# Patient Record
Sex: FEMALE | Race: WHITE | Employment: FULL TIME
[De-identification: names, ages, dates, MRNs, and addresses within clinical notes are randomized per-mention and may not be internally consistent; named-entity substitution may affect disease eponyms.]

---

## 2022-09-20 ENCOUNTER — OFFICE VISIT (OUTPATIENT)
Dept: INTERNAL MEDICINE CLINIC | Facility: CLINIC | Age: 47
End: 2022-09-20
Payer: COMMERCIAL

## 2022-09-20 VITALS
HEIGHT: 66 IN | HEART RATE: 74 BPM | WEIGHT: 116.2 LBS | DIASTOLIC BLOOD PRESSURE: 70 MMHG | BODY MASS INDEX: 18.68 KG/M2 | TEMPERATURE: 98.6 F | OXYGEN SATURATION: 97 % | SYSTOLIC BLOOD PRESSURE: 86 MMHG | RESPIRATION RATE: 12 BRPM

## 2022-09-20 DIAGNOSIS — L40.9 PSORIASIS: ICD-10-CM

## 2022-09-20 DIAGNOSIS — G47.9 SLEEP DISTURBANCE: ICD-10-CM

## 2022-09-20 DIAGNOSIS — Z12.11 ENCOUNTER FOR SCREENING FOR COLORECTAL MALIGNANT NEOPLASM: ICD-10-CM

## 2022-09-20 DIAGNOSIS — Z23 ENCOUNTER FOR IMMUNIZATION: Primary | ICD-10-CM

## 2022-09-20 DIAGNOSIS — F41.9 ANXIETY: ICD-10-CM

## 2022-09-20 DIAGNOSIS — Z12.31 ENCOUNTER FOR SCREENING MAMMOGRAM FOR MALIGNANT NEOPLASM OF BREAST: ICD-10-CM

## 2022-09-20 DIAGNOSIS — H50.9 STRABISMUS: ICD-10-CM

## 2022-09-20 DIAGNOSIS — Z12.12 ENCOUNTER FOR SCREENING FOR COLORECTAL MALIGNANT NEOPLASM: ICD-10-CM

## 2022-09-20 PROCEDURE — 90471 IMMUNIZATION ADMIN: CPT | Performed by: INTERNAL MEDICINE

## 2022-09-20 PROCEDURE — 99204 OFFICE O/P NEW MOD 45 MIN: CPT | Performed by: INTERNAL MEDICINE

## 2022-09-20 PROCEDURE — 90715 TDAP VACCINE 7 YRS/> IM: CPT | Performed by: INTERNAL MEDICINE

## 2022-09-20 RX ORDER — ESZOPICLONE 2 MG/1
2 TABLET, FILM COATED ORAL NIGHTLY
Qty: 30 TABLET | Refills: 0 | Status: SHIPPED | OUTPATIENT
Start: 2022-11-21 | End: 2022-10-27

## 2022-09-20 RX ORDER — ALPRAZOLAM 1 MG/1
1 TABLET ORAL NIGHTLY PRN
COMMUNITY
End: 2022-09-20 | Stop reason: SDUPTHER

## 2022-09-20 RX ORDER — ALPRAZOLAM 1 MG/1
1 TABLET ORAL NIGHTLY PRN
Qty: 30 TABLET | Refills: 0 | Status: SHIPPED | OUTPATIENT
Start: 2022-09-20 | End: 2022-10-20

## 2022-09-20 RX ORDER — FLUOCINONIDE TOPICAL SOLUTION USP, 0.05% 0.5 MG/ML
SOLUTION TOPICAL DAILY
COMMUNITY
Start: 2020-10-26 | End: 2022-10-27

## 2022-09-20 RX ORDER — ESZOPICLONE 2 MG/1
2 TABLET, FILM COATED ORAL NIGHTLY PRN
Qty: 30 TABLET | Refills: 0 | Status: SHIPPED | OUTPATIENT
Start: 2022-09-20 | End: 2022-10-20

## 2022-09-20 RX ORDER — ESZOPICLONE 2 MG/1
2 TABLET, FILM COATED ORAL NIGHTLY
Qty: 30 TABLET | Refills: 0 | Status: SHIPPED | OUTPATIENT
Start: 2022-10-21 | End: 2022-11-20

## 2022-09-20 RX ORDER — VALACYCLOVIR HYDROCHLORIDE 1 G/1
TABLET, FILM COATED ORAL AS NEEDED
COMMUNITY
Start: 2022-08-30

## 2022-09-20 ASSESSMENT — PATIENT HEALTH QUESTIONNAIRE - PHQ9
SUM OF ALL RESPONSES TO PHQ QUESTIONS 1-9: 0
2. FEELING DOWN, DEPRESSED OR HOPELESS: 0
SUM OF ALL RESPONSES TO PHQ9 QUESTIONS 1 & 2: 0
SUM OF ALL RESPONSES TO PHQ QUESTIONS 1-9: 0
1. LITTLE INTEREST OR PLEASURE IN DOING THINGS: 0
SUM OF ALL RESPONSES TO PHQ QUESTIONS 1-9: 0
SUM OF ALL RESPONSES TO PHQ QUESTIONS 1-9: 0

## 2022-09-20 ASSESSMENT — ENCOUNTER SYMPTOMS: RESPIRATORY NEGATIVE: 1
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
## 2022-09-29 ENCOUNTER — TELEPHONE (OUTPATIENT)
Dept: FAMILY MEDICINE CLINIC | Facility: CLINIC | Age: 47
End: 2022-09-29

## 2022-09-29 NOTE — TELEPHONE ENCOUNTER
Received fax on behalf of patient from [facility] notifying our office that they have attempted to contact the patient several times regarding the referral that was placed to their office from Dr. Lisa Lopez with no response from the patient. This referral will be closed, and the patient will need to contact their office, if they would like to proceed with the referral process.

## 2022-10-12 ENCOUNTER — OFFICE VISIT (OUTPATIENT)
Dept: GASTROENTEROLOGY | Age: 47
End: 2022-10-12
Payer: COMMERCIAL

## 2022-10-12 VITALS
HEIGHT: 66 IN | TEMPERATURE: 98.1 F | HEART RATE: 84 BPM | BODY MASS INDEX: 18.48 KG/M2 | DIASTOLIC BLOOD PRESSURE: 87 MMHG | SYSTOLIC BLOOD PRESSURE: 90 MMHG | WEIGHT: 115 LBS | OXYGEN SATURATION: 97 %

## 2022-10-12 DIAGNOSIS — Z12.11 ENCOUNTER FOR SCREENING COLONOSCOPY: Primary | ICD-10-CM

## 2022-10-12 PROCEDURE — 99202 OFFICE O/P NEW SF 15 MIN: CPT | Performed by: INTERNAL MEDICINE

## 2022-10-12 RX ORDER — POLYETHYLENE GLYCOL 3350, SODIUM SULFATE ANHYDROUS, SODIUM BICARBONATE, SODIUM CHLORIDE, POTASSIUM CHLORIDE 236; 22.74; 6.74; 5.86; 2.97 G/4L; G/4L; G/4L; G/4L; G/4L
4 POWDER, FOR SOLUTION ORAL ONCE
Qty: 4000 ML | Refills: 0 | Status: SHIPPED | OUTPATIENT
Start: 2022-10-12 | End: 2022-10-12

## 2022-10-12 ASSESSMENT — ENCOUNTER SYMPTOMS
ABDOMINAL PAIN: 0
BLOOD IN STOOL: 0
COLOR CHANGE: 0
VOMITING: 0
TROUBLE SWALLOWING: 0
SHORTNESS OF BREATH: 0

## 2022-10-12 NOTE — PROGRESS NOTES
Fantasma Buenrostro (:  1975) is a 49 y.o. female, new patient here for evaluation of the following chief complaint(s):  Colonoscopy, Constipation (Always had an issue), Bloated, and Abdominal Pain (Cramping intermit)         ASSESSMENT/PLAN:  1. Encounter for screening colonoscopy  The following orders have not been finalized:  -     PEG 3350-KCl-NaBcb-NaCl-NaSulf (PEG-3350/ELECTROLYTES) 236 g SOLR           Subjective   SUBJECTIVE/OBJECTIVE  Presenting to arrange a screening colonoscopic evaluation denied any change in bowel habits blood in stool. No family history of colon cancer. Her sister had colon polyps. Had a history of lactose intolerance she has been checked for gluten allergy serologically in the past which was negative. He has a history of constipation and occasional left lower quadrant discomfort since her laparoscopic surgical treatment for infertility. Past Medical History:   Diagnosis Date    Strabismus 2022       No past surgical history on file. Allergies   Allergen Reactions    Terfenadine Hives    Scopolamine Nausea And Vomiting     Other reaction(s): GI Upset            Review of Systems   Constitutional:  Negative for appetite change. HENT:  Negative for mouth sores and trouble swallowing. Respiratory:  Negative for shortness of breath. Cardiovascular:  Negative for chest pain. Gastrointestinal:  Negative for abdominal pain, blood in stool and vomiting. Skin:  Negative for color change. Allergic/Immunologic: Negative for food allergies. Neurological:  Negative for seizures and weakness. Hematological:  Does not bruise/bleed easily. Objective   Physical Exam  HENT:      Head: Normocephalic. Mouth/Throat:      Mouth: Mucous membranes are moist.   Eyes:      General: No scleral icterus. Cardiovascular:      Rate and Rhythm: Normal rate and regular rhythm. Pulmonary:      Effort: No respiratory distress.    Abdominal:      General: There is no distension. Tenderness: There is no abdominal tenderness. There is no rebound. Lymphadenopathy:      Cervical: No cervical adenopathy. Skin:     Coloration: Skin is not jaundiced. Findings: No bruising. Neurological:      General: No focal deficit present. Mental Status: She is alert. Current Outpatient Medications   Medication Sig Dispense Refill    fluocinonide (LIDEX) 0.05 % external solution Apply topically daily      valACYclovir (VALTREX) 1 g tablet TAKE 2 TABLETS BY MOUTH TWICE A DAY      eszopiclone (LUNESTA) 2 MG TABS Take 1 tablet by mouth nightly as needed (for sleep) for up to 30 days. 30 tablet 0    [START ON 10/21/2022] eszopiclone (LUNESTA) 2 MG TABS Take 1 tablet by mouth nightly for 30 days. 30 tablet 0    [START ON 11/21/2022] eszopiclone (LUNESTA) 2 MG TABS Take 1 tablet by mouth nightly for 30 days. 30 tablet 0    ALPRAZolam (XANAX) 1 MG tablet Take 1 tablet by mouth nightly as needed for Anxiety for up to 30 days. 30 tablet 0     No current facility-administered medications for this visit. No family history on file. Return for colonoscopy. An electronic signature was used to authenticate this note.     --Jeannie Pat MD

## 2022-10-12 NOTE — H&P (VIEW-ONLY)
Esther Barth (:  1975) is a 49 y.o. female, new patient here for evaluation of the following chief complaint(s):  Colonoscopy, Constipation (Always had an issue), Bloated, and Abdominal Pain (Cramping intermit)         ASSESSMENT/PLAN:  1. Encounter for screening colonoscopy  The following orders have not been finalized:  -     PEG 3350-KCl-NaBcb-NaCl-NaSulf (PEG-3350/ELECTROLYTES) 236 g SOLR           Subjective   SUBJECTIVE/OBJECTIVE  Presenting to arrange a screening colonoscopic evaluation denied any change in bowel habits blood in stool. No family history of colon cancer. Her sister had colon polyps. Had a history of lactose intolerance she has been checked for gluten allergy serologically in the past which was negative. He has a history of constipation and occasional left lower quadrant discomfort since her laparoscopic surgical treatment for infertility. Past Medical History:   Diagnosis Date    Strabismus 2022       No past surgical history on file. Allergies   Allergen Reactions    Terfenadine Hives    Scopolamine Nausea And Vomiting     Other reaction(s): GI Upset            Review of Systems   Constitutional:  Negative for appetite change. HENT:  Negative for mouth sores and trouble swallowing. Respiratory:  Negative for shortness of breath. Cardiovascular:  Negative for chest pain. Gastrointestinal:  Negative for abdominal pain, blood in stool and vomiting. Skin:  Negative for color change. Allergic/Immunologic: Negative for food allergies. Neurological:  Negative for seizures and weakness. Hematological:  Does not bruise/bleed easily. Objective   Physical Exam  HENT:      Head: Normocephalic. Mouth/Throat:      Mouth: Mucous membranes are moist.   Eyes:      General: No scleral icterus. Cardiovascular:      Rate and Rhythm: Normal rate and regular rhythm. Pulmonary:      Effort: No respiratory distress.    Abdominal:      General: There is no distension. Tenderness: There is no abdominal tenderness. There is no rebound. Lymphadenopathy:      Cervical: No cervical adenopathy. Skin:     Coloration: Skin is not jaundiced. Findings: No bruising. Neurological:      General: No focal deficit present. Mental Status: She is alert. Current Outpatient Medications   Medication Sig Dispense Refill    fluocinonide (LIDEX) 0.05 % external solution Apply topically daily      valACYclovir (VALTREX) 1 g tablet TAKE 2 TABLETS BY MOUTH TWICE A DAY      eszopiclone (LUNESTA) 2 MG TABS Take 1 tablet by mouth nightly as needed (for sleep) for up to 30 days. 30 tablet 0    [START ON 10/21/2022] eszopiclone (LUNESTA) 2 MG TABS Take 1 tablet by mouth nightly for 30 days. 30 tablet 0    [START ON 11/21/2022] eszopiclone (LUNESTA) 2 MG TABS Take 1 tablet by mouth nightly for 30 days. 30 tablet 0    ALPRAZolam (XANAX) 1 MG tablet Take 1 tablet by mouth nightly as needed for Anxiety for up to 30 days. 30 tablet 0     No current facility-administered medications for this visit. No family history on file. Return for colonoscopy. An electronic signature was used to authenticate this note.     --Domitila Lopez MD

## 2022-10-21 ENCOUNTER — PREP FOR PROCEDURE (OUTPATIENT)
Dept: GASTROENTEROLOGY | Age: 47
End: 2022-10-21

## 2022-10-21 ENCOUNTER — PREP FOR PROCEDURE (OUTPATIENT)
Dept: VASCULAR SURGERY | Age: 47
End: 2022-10-21

## 2022-10-21 PROBLEM — Z12.11 ENCOUNTER FOR SCREENING COLONOSCOPY: Status: ACTIVE | Noted: 2022-10-21

## 2022-10-21 RX ORDER — SODIUM CHLORIDE 0.9 % (FLUSH) 0.9 %
5-40 SYRINGE (ML) INJECTION EVERY 12 HOURS SCHEDULED
Status: CANCELLED | OUTPATIENT
Start: 2022-10-21

## 2022-10-21 RX ORDER — SODIUM CHLORIDE 9 MG/ML
25 INJECTION, SOLUTION INTRAVENOUS PRN
Status: CANCELLED | OUTPATIENT
Start: 2022-10-21

## 2022-10-21 RX ORDER — SODIUM CHLORIDE 0.9 % (FLUSH) 0.9 %
5-40 SYRINGE (ML) INJECTION PRN
Status: CANCELLED | OUTPATIENT
Start: 2022-10-21

## 2022-10-26 RX ORDER — POLYETHYLENE GLYCOL-3350 AND ELECTROLYTES 236; 6.74; 5.86; 2.97; 22.74 G/274.31G; G/274.31G; G/274.31G; G/274.31G; G/274.31G
POWDER, FOR SOLUTION ORAL
COMMUNITY
Start: 2022-10-12 | End: 2022-10-27

## 2022-10-26 RX ORDER — CLONAZEPAM 0.5 MG/1
0.5 TABLET ORAL
COMMUNITY
Start: 2011-11-28 | End: 2022-10-27

## 2022-10-27 NOTE — PERIOP NOTE
Patient verified name, , and procedure. Type: 1a; abbreviated assessment per anesthesia guidelines    Labs per anesthesia: none    Instructed pt that they will be notified the day before their procedure by the GI Lab for time of arrival if their procedure is  [address] and Pre-op for [address]. Arrival times should be called by 5 pm. If no phone is received the patient should contact their respective hospital. The GI lab telephone number is [phone] and ES Pre-op is [phone]. Follow diet and prep instructions per office including NPO status. If patient has NOT received instructions from office patient is advised to call surgeon office, verbalizes understanding. Bath or shower the night before and the am of surgery with non-mositurizing soap. No lotions, oils, powders, cologne on skin. No make up, eye make up or jewelry. Wear loose fitting comfortable, clean clothing. Must have adult present in building the entire time . Medications for the day of procedure none, patient to hold Multivitamin and all other vitamins,supplements, and NSIADs. The following discharge instructions reviewed with patient: medication given during procedure may cause drowsiness for several hours, therefore, do not drive or operate machinery for remainder of the day. You may not drink alcohol on the day of your procedure, please resume regular diet and activity unless otherwise directed. You may experience abdominal distention for several hours that is relieved by the passage of gas. Contact your physician if you have any of the following: fever or chills, severe abdominal pain or excessive amount of bleeding or a large amount when having a bowel movement.  Occasional specks of blood with bowel movement would not be unusual.

## 2022-10-28 NOTE — PROGRESS NOTES
RN called Pt to confirm appointment time off [phone], arrival time 923, location,  requirement, and instructions for registration at the hospital.  Pt verbalized understanding.

## 2022-10-30 ENCOUNTER — ANESTHESIA EVENT (OUTPATIENT)
Dept: ENDOSCOPY | Age: 47
End: 2022-10-30
Payer: COMMERCIAL

## 2022-10-31 ENCOUNTER — ANESTHESIA (OUTPATIENT)
Dept: ENDOSCOPY | Age: 47
End: 2022-10-31
Payer: COMMERCIAL

## 2022-10-31 ENCOUNTER — HOSPITAL ENCOUNTER (OUTPATIENT)
Age: 47
Setting detail: OUTPATIENT SURGERY
Discharge: HOME | End: 2022-10-31
Attending: INTERNAL MEDICINE | Admitting: INTERNAL MEDICINE
Payer: COMMERCIAL

## 2022-10-31 VITALS
BODY MASS INDEX: 18.48 KG/M2 | HEART RATE: 65 BPM | OXYGEN SATURATION: 97 % | TEMPERATURE: 97.3 F | HEIGHT: 66 IN | DIASTOLIC BLOOD PRESSURE: 54 MMHG | RESPIRATION RATE: 16 BRPM | WEIGHT: 115 LBS | SYSTOLIC BLOOD PRESSURE: 97 MMHG

## 2022-10-31 DIAGNOSIS — Z12.11 ENCOUNTER FOR SCREENING COLONOSCOPY: ICD-10-CM

## 2022-10-31 PROCEDURE — 3700000001 HC ADD 15 MINUTES (ANESTHESIA): Performed by: INTERNAL MEDICINE

## 2022-10-31 PROCEDURE — 88305 TISSUE EXAM BY PATHOLOGIST: CPT

## 2022-10-31 PROCEDURE — 2709999900 HC NON-CHARGEABLE SUPPLY: Performed by: INTERNAL MEDICINE

## 2022-10-31 PROCEDURE — 6360000002 HC RX W HCPCS

## 2022-10-31 PROCEDURE — 2580000003 HC RX 258

## 2022-10-31 PROCEDURE — 7100000010 HC PHASE II RECOVERY - FIRST 15 MIN: Performed by: INTERNAL MEDICINE

## 2022-10-31 PROCEDURE — 2580000003 HC RX 258: Performed by: ANESTHESIOLOGY

## 2022-10-31 PROCEDURE — 2500000003 HC RX 250 WO HCPCS

## 2022-10-31 PROCEDURE — 3700000000 HC ANESTHESIA ATTENDED CARE: Performed by: INTERNAL MEDICINE

## 2022-10-31 PROCEDURE — 3609027000 HC COLONOSCOPY: Performed by: INTERNAL MEDICINE

## 2022-10-31 PROCEDURE — 7100000011 HC PHASE II RECOVERY - ADDTL 15 MIN: Performed by: INTERNAL MEDICINE

## 2022-10-31 RX ORDER — SODIUM CHLORIDE 0.9 % (FLUSH) 0.9 %
5-40 SYRINGE (ML) INJECTION PRN
Status: DISCONTINUED | OUTPATIENT
Start: 2022-10-31 | End: 2022-10-31 | Stop reason: HOSPADM

## 2022-10-31 RX ORDER — PROPOFOL 10 MG/ML
INJECTION, EMULSION INTRAVENOUS CONTINUOUS PRN
Status: DISCONTINUED | OUTPATIENT
Start: 2022-10-31 | End: 2022-10-31 | Stop reason: SDUPTHER

## 2022-10-31 RX ORDER — PROPOFOL 10 MG/ML
INJECTION, EMULSION INTRAVENOUS PRN
Status: DISCONTINUED | OUTPATIENT
Start: 2022-10-31 | End: 2022-10-31 | Stop reason: SDUPTHER

## 2022-10-31 RX ORDER — SODIUM CHLORIDE 0.9 % (FLUSH) 0.9 %
5-40 SYRINGE (ML) INJECTION EVERY 12 HOURS SCHEDULED
Status: DISCONTINUED | OUTPATIENT
Start: 2022-10-31 | End: 2022-10-31 | Stop reason: HOSPADM

## 2022-10-31 RX ORDER — LIDOCAINE HYDROCHLORIDE 20 MG/ML
INJECTION, SOLUTION EPIDURAL; INFILTRATION; INTRACAUDAL; PERINEURAL PRN
Status: DISCONTINUED | OUTPATIENT
Start: 2022-10-31 | End: 2022-10-31 | Stop reason: SDUPTHER

## 2022-10-31 RX ORDER — SODIUM CHLORIDE, SODIUM LACTATE, POTASSIUM CHLORIDE, CALCIUM CHLORIDE 600; 310; 30; 20 MG/100ML; MG/100ML; MG/100ML; MG/100ML
INJECTION, SOLUTION INTRAVENOUS CONTINUOUS
Status: DISCONTINUED | OUTPATIENT
Start: 2022-10-31 | End: 2022-10-31 | Stop reason: HOSPADM

## 2022-10-31 RX ORDER — SODIUM CHLORIDE 9 MG/ML
25 INJECTION, SOLUTION INTRAVENOUS PRN
Status: DISCONTINUED | OUTPATIENT
Start: 2022-10-31 | End: 2022-10-31 | Stop reason: HOSPADM

## 2022-10-31 RX ADMIN — SODIUM CHLORIDE, POTASSIUM CHLORIDE, SODIUM LACTATE AND CALCIUM CHLORIDE: 600; 310; 30; 20 INJECTION, SOLUTION INTRAVENOUS at 08:56

## 2022-10-31 RX ADMIN — PROPOFOL 120 MCG/KG/MIN: 10 INJECTION, EMULSION INTRAVENOUS at 09:31

## 2022-10-31 RX ADMIN — LIDOCAINE HYDROCHLORIDE 40 MG: 20 INJECTION, SOLUTION EPIDURAL; INFILTRATION; INTRACAUDAL; PERINEURAL at 09:30

## 2022-10-31 RX ADMIN — PROPOFOL 40 MG: 10 INJECTION, EMULSION INTRAVENOUS at 09:32

## 2022-10-31 RX ADMIN — PHENYLEPHRINE HYDROCHLORIDE 100 MCG: 10 INJECTION INTRAVENOUS at 09:56

## 2022-10-31 RX ADMIN — PHENYLEPHRINE HYDROCHLORIDE 100 MCG: 10 INJECTION INTRAVENOUS at 09:52

## 2022-10-31 RX ADMIN — PROPOFOL 40 MG: 10 INJECTION, EMULSION INTRAVENOUS at 09:30

## 2022-10-31 ASSESSMENT — PAIN - FUNCTIONAL ASSESSMENT: PAIN_FUNCTIONAL_ASSESSMENT: 0-10

## 2022-10-31 NOTE — ANESTHESIA PRE PROCEDURE
Department of Anesthesiology  Preprocedure Note       Name:  Manuel Cárdenas   Age:  49 y.o.  :  1975                                          MRN:  [id number]         Date:  10/31/2022      Surgeon: Micky Santacruz):  Carlos Andrade MD    Procedure: Procedure(s):  COLORECTAL CANCER SCREENING, NOT HIGH RISK    Medications prior to admission:   Prior to Admission medications    Medication Sig Start Date End Date Taking? Authorizing Provider   Multiple Vitamin (MULTIVITAMIN ADULT PO) Take 1 tablet by mouth daily   Yes Historical Provider, MD   valACYclovir (VALTREX) 1 g tablet as needed 22   Historical Provider, MD   eszopiclone (LUNESTA) 2 MG TABS Take 1 tablet by mouth nightly for 30 days. 10/21/22 11/20/22  Sri Frost DO       Current medications:    Current Facility-Administered Medications   Medication Dose Route Frequency Provider Last Rate Last Admin    sodium chloride flush 0.9 % injection 5-40 mL  5-40 mL IntraVENous 2 times per day Carlos Andrade MD        sodium chloride flush 0.9 % injection 5-40 mL  5-40 mL IntraVENous PRN Carlos Andrade MD        0.9 % sodium chloride infusion  25 mL IntraVENous PRN Carlos Andrade MD        lactated ringers infusion   IntraVENous Continuous Jered Pandey  mL/hr at 10/31/22 0856 New Bag at 10/31/22 0856       Allergies:     Allergies   Allergen Reactions    Terfenadine Hives    Scopolamine Nausea And Vomiting     Abdominal cramping         Problem List:    Patient Active Problem List   Diagnosis Code    Psoriasis L40.9    Sleep disturbance G47.9    Anxiety F41.9    Strabismus H50.9    Encounter for screening colonoscopy Z12.11       Past Medical History:        Diagnosis Date    History of kidney stones     X1 with surgical intervention    Psoriasis     Strabismus 2022       Past Surgical History:        Procedure Laterality Date    CYSTOSCOPY W/ URETERAL STENT PLACEMENT      OTHER SURGICAL HISTORY      jaw surgery    OTHER SURGICAL HISTORY nasal surgery       Social History:    Social History     Tobacco Use    Smoking status: Never    Smokeless tobacco: Never   Substance Use Topics    Alcohol use: Yes     Alcohol/week: 5.0 standard drinks     Types: 5 Glasses of wine per week                                Counseling given: Not Answered      Vital Signs (Current):   Vitals:    10/27/22 1026 10/31/22 0858   BP:  93/66   Pulse:  72   Resp:  24   Temp:  98.2 °F (36.8 °C)   TempSrc:  Oral   SpO2:  100%   Weight: 115 lb (52.2 kg)    Height: 5' 6\" (1.676 m)                                               BP Readings from Last 3 Encounters:   10/31/22 93/66   10/12/22 90/87   09/20/22 86/70       NPO Status: Time of last liquid consumption: 2300                        Time of last solid consumption: 1900                        Date of last liquid consumption: 10/30/22                        Date of last solid food consumption: 10/29/22    BMI:   Wt Readings from Last 3 Encounters:   10/27/22 115 lb (52.2 kg)   10/12/22 115 lb (52.2 kg)   09/20/22 116 lb 3.2 oz (52.7 kg)     Body mass index is 18.56 kg/m². CBC: No results found for: WBC, RBC, HGB, HCT, MCV, RDW, PLT    CMP: No results found for: NA, K, CL, CO2, BUN, CREATININE, GFRAA, AGRATIO, LABGLOM, GLUCOSE, GLU, PROT, CALCIUM, BILITOT, ALKPHOS, AST, ALT    POC Tests: No results for input(s): POCGLU, POCNA, POCK, POCCL, POCBUN, POCHEMO, POCHCT in the last 72 hours.     Coags: No results found for: PROTIME, INR, APTT    HCG (If Applicable): No results found for: PREGTESTUR, PREGSERUM, HCG, HCGQUANT     ABGs: No results found for: PHART, PO2ART, OLH0DYF, NUM6PRD, BEART, H6ZGTFAK     Type & Screen (If Applicable):  No results found for: LABABO, LABRH    Drug/Infectious Status (If Applicable):  No results found for: HIV, HEPCAB    COVID-19 Screening (If Applicable): No results found for: COVID19        Anesthesia Evaluation  Patient summary reviewed and Nursing notes reviewed  Airway: Mallampati: II  TM distance: >3 FB   Neck ROM: full  Mouth opening: > = 3 FB   Dental:          Pulmonary:Negative Pulmonary ROS breath sounds clear to auscultation                             Cardiovascular:  Exercise tolerance: good (>4 METS),           Rhythm: regular  Rate: normal                    Neuro/Psych:   (+) psychiatric history:depression/anxiety             GI/Hepatic/Renal: Neg GI/Hepatic/Renal ROS            Endo/Other: Negative Endo/Other ROS                    Abdominal:             Vascular: negative vascular ROS. Other Findings:           Anesthesia Plan      TIVA     ASA 2       Induction: intravenous. Anesthetic plan and risks discussed with patient.                         Zachary Ghotra MD   10/31/2022

## 2022-10-31 NOTE — DISCHARGE INSTRUCTIONS
Gastrointestinal Colonoscopy/Flexible Sigmoidoscopy - Lower Exam Discharge Instructions  Call Dr. Uriel Lassiter for any problems or questions. Contact the doctors office for follow up appointment as directed  Medication may cause drowsiness for several hours, therefore, do not drive or operate machinery for remainder of the day. No alcohol today. Do not make any important decisions such signing legal paperwork. Ordinarily, you may resume regular diet and activity after exam unless otherwise specified by your physician. Because of air put into your colon during exam, you may experience some abdominal distension, relieved by the passage of gas, for several hours. Contact your physician if you have any of the following:  Excessive amount of bleeding - large amount when having a bowel movement.   Occasional specks of blood with bowel movement would not be unusual.  Severe abdominal pain  Fever or Chills      Any additional instructions:   Repeat colon in 5 years pending pathology

## 2022-10-31 NOTE — INTERVAL H&P NOTE
Update History & Physical    The patient's History and Physical of October 12,  was reviewed with the patient and I examined the patient. There was no change. The surgical site was confirmed by the patient and me. Plan: The risks, benefits, expected outcome, and alternative to the recommended procedure have been discussed with the patient. Patient understands and wants to proceed with the procedure.      Electronically signed by Barrett Horton MD on 10/31/2022 at 8:58 AM

## 2022-10-31 NOTE — PROCEDURES
Operative Report    Patient: Manuel Cárdenas MRN: [id number]      YOB: 1975  Age: 49 y.o. Sex: female            Indications:  screening for colon cancer [unfilled]     Preoperative Evaluation: The patient was evaluated prior to the procedure in the GI lab admission area, the patient ASA was recorded . Consent was obtained from the patient with the risk of perforation bleeding and aspiration. Anesthesia: CHAPIN-per anesthesia    Complications: None; patient tolerated the procedure well. EBL -insignificant      Procedure: The patient was sedated in the left lateral decubitus position. Scope was advanced from the rectum to the cecum. Evaluation was performed to the cecum twice. The scope was withdrawn to the rectum, retroflexed view was performed. The rectal exam was normal.  Preparation was good [place] score of 3/3/3/:9 . Findings:   Exam to the cecum. Very tortuous colon in its course with multiple sharp angulation. Cecum examined twice. Normal cecal mucosa. A flat serrated 1 cm ascending colon polyp cold snared into 2 pieces. Normal transverse descending sigmoid and rectal mucosa      Postoperative Diagnosis: Ascending colon polyp    08050 Colonoscopy, Flexible; with removal of tumor(s), polyp(s), or other lesion(s) by snare technique      Recommendations:   - If adenoma is present, repeat colonoscopy in 5 years.       Signed By:  Carlos Andrade MD     October 31, 2022

## 2022-10-31 NOTE — ANESTHESIA POSTPROCEDURE EVALUATION
Department of Anesthesiology  Postprocedure Note    Patient: Loulou Pollard  MRN: [id number]  YOB: 1975  Date of evaluation: 10/31/2022      Procedure Summary     Date: 10/31/22 Room / Location: [facility] ENDO 03 / [facility] ENDOSCOPY    Anesthesia Start: 2567 Anesthesia Stop: 1004    Procedure: COLORECTAL CANCER SCREENING, NOT HIGH RISK/POLYPECTOMY - COLD SNARE (Lower GI Region) Diagnosis:       Encounter for screening colonoscopy      (Encounter for screening colonoscopy [Z12.11])    Surgeons: Alphonse Strickland MD Responsible Provider: Wesley Mcguire MD    Anesthesia Type: TIVA ASA Status: 2          Anesthesia Type: No value filed.     Jaren Phase I: Jaren Score: 10    Jaren Phase II: Jaren Score: 10      Anesthesia Post Evaluation    Patient location during evaluation: PACU  Patient participation: complete - patient participated  Level of consciousness: awake and alert  Airway patency: patent  Nausea & Vomiting: no nausea and no vomiting  Complications: no  Cardiovascular status: hemodynamically stable  Respiratory status: acceptable  Hydration status: euvolemic  Multimodal analgesia pain management approach

## 2022-11-11 ENCOUNTER — TELEMEDICINE (OUTPATIENT)
Dept: INTERNAL MEDICINE CLINIC | Facility: CLINIC | Age: 47
End: 2022-11-11
Payer: COMMERCIAL

## 2022-11-11 DIAGNOSIS — F41.9 ANXIETY: Primary | ICD-10-CM

## 2022-11-11 DIAGNOSIS — G47.9 SLEEP DISTURBANCE: ICD-10-CM

## 2022-11-11 PROCEDURE — 99213 OFFICE O/P EST LOW 20 MIN: CPT | Performed by: INTERNAL MEDICINE

## 2022-11-11 RX ORDER — ALPRAZOLAM 1 MG/1
1 TABLET ORAL NIGHTLY PRN
Qty: 30 TABLET | Refills: 2 | Status: SHIPPED | OUTPATIENT
Start: 2022-11-11 | End: 2023-02-09

## 2022-11-11 RX ORDER — VALACYCLOVIR HYDROCHLORIDE 1 G/1
1 TABLET, FILM COATED ORAL AS NEEDED
Qty: 30 TABLET | Refills: 1 | Status: SHIPPED | OUTPATIENT
Start: 2022-11-11

## 2022-11-11 RX ORDER — FLUOCINONIDE TOPICAL SOLUTION USP, 0.05% 0.5 MG/ML
SOLUTION TOPICAL
COMMUNITY
Start: 2022-10-27

## 2022-11-11 RX ORDER — ESZOPICLONE 2 MG/1
2 TABLET, FILM COATED ORAL NIGHTLY
Qty: 30 TABLET | Refills: 2 | Status: SHIPPED | OUTPATIENT
Start: 2022-11-11 | End: 2023-02-09

## 2022-11-11 RX ORDER — ALPRAZOLAM 1 MG/1
1 TABLET ORAL NIGHTLY PRN
COMMUNITY
End: 2022-11-11 | Stop reason: SDUPTHER

## 2022-11-11 ASSESSMENT — PATIENT HEALTH QUESTIONNAIRE - PHQ9
1. LITTLE INTEREST OR PLEASURE IN DOING THINGS: 0
2. FEELING DOWN, DEPRESSED OR HOPELESS: 0
SUM OF ALL RESPONSES TO PHQ QUESTIONS 1-9: 0
SUM OF ALL RESPONSES TO PHQ9 QUESTIONS 1 & 2: 0
SUM OF ALL RESPONSES TO PHQ QUESTIONS 1-9: 0

## 2022-11-11 ASSESSMENT — ENCOUNTER SYMPTOMS
RESPIRATORY NEGATIVE: 1
GASTROINTESTINAL NEGATIVE: 1

## 2022-11-11 NOTE — PROGRESS NOTES
Yadi Muñoz D.O. [address]  [address], [address] [facility] [address]  Tel: [phone]    Virtual Visit: Follow Up     Patient Name: Wilton Mccall   :  1975   MRN:   [id number]      Today's Date: 22 10:51 AM    Subjective     The patient is a 49y.o.-year-old female who presents via telehealth for follow-up. Today: Patient is doing well. She states that she has no new symptoms or complaints. She just recently completed her colonoscopy last week and a hyperplastic polyp was found in the ascending colon. She needs refills on her Xanax and her Lunesta. She is taking them as prescribed and states they are still working well for her. She states that she does have a new job opportunity back in [employer], she states that if she does take it she will be spending a lot more time back in [place] but will still be going back and forth between [place] in [facility] [place]. Review of Systems   Constitutional: Negative. HENT: Negative. Respiratory: Negative. Cardiovascular: Negative. Gastrointestinal: Negative. Genitourinary: Negative. Musculoskeletal: Negative. Skin: Negative. Neurological: Negative. Psychiatric/Behavioral: Negative. Past Medical History:   Diagnosis Date    History of kidney stones     X1 with surgical intervention    Psoriasis     Strabismus 2022     Social History     Socioeconomic History    Marital status: Single     Spouse name: Not on file    Number of children: Not on file    Years of education: Not on file    Highest education level: Not on file   Occupational History    Not on file   Tobacco Use    Smoking status: Never    Smokeless tobacco: Never   Vaping Use    Vaping Use: Never used   Substance and Sexual Activity    Alcohol use:  Yes     Alcohol/week: 5.0 standard drinks     Types: 5 Glasses of wine per week    Drug use: Never    Sexual activity: Not on file   Other Topics Concern    Not on file Social History Narrative    Not on file     Social Determinants of Health     Financial Resource Strain: Not on file   Food Insecurity: Not on file   Transportation Needs: Not on file   Physical Activity: Not on file   Stress: Not on file   Social Connections: Not on file   Intimate Partner Violence: Not on file   Housing Stability: Not on file         Current Outpatient Medications   Medication Sig    fluocinonide (LIDEX) 0.05 % external solution     ALPRAZolam (XANAX) 1 MG tablet Take 1 mg by mouth nightly as needed for Sleep or Anxiety. Multiple Vitamin (MULTIVITAMIN ADULT PO) Take 1 tablet by mouth daily    valACYclovir (VALTREX) 1 g tablet as needed    eszopiclone (LUNESTA) 2 MG TABS Take 1 tablet by mouth nightly for 30 days. No current facility-administered medications for this visit. Objective     There were no vitals filed for this visit. Physical Exam:  Constitutional: Appears well kempt. Alert/oriented x3. In no acute distress. Head: Normocephalic No trauma. Psychiatric: Normal thought content. Normal behavior. Normal judgment. Recommendations     Assessment:  Patient Active Problem List   Diagnosis    Psoriasis    Sleep disturbance    Anxiety    Strabismus    Encounter for screening colonoscopy      Plan:  -Refill Xanax  -Refill Lunesta    -Previous medical records and/or labs/tests available to me reviewed, any records outstanding not available requested  -The risks, benefits, and medical necessity of all medications, tests labs, and any other orders that were ordered at today's visit were discussed with the patient including all elective or patient requested orders. Decision to order or not order tests or based on this risk/benefit ratio and medical necessity.  -The patient expresses understanding of the plan as I've explained it to her and is in agreement with the current plan.     Follow Up: 3 months virtually    Signed: Joe Thomas D.O.  11/11/22  10:51 AM    This visit was conducted using telehealth services of either [email or web address] or [address] [place] [address]. The physical exam was limited due to the nature of the virtual visit. The patient's consent was obtained to conduct such a visit. Total time face-to-face via video was 15 minutes. All concerns were discussed and addressed at this visit. If the patient has any other questions or concerns they are to call to schedule an in person appointment or if it is an emergency, go to the ER.

## 2022-11-16 ENCOUNTER — APPOINTMENT (RX ONLY)
Dept: URBAN - METROPOLITAN AREA CLINIC 329 | Facility: CLINIC | Age: 47
Setting detail: DERMATOLOGY
End: 2022-11-16

## 2022-11-16 DIAGNOSIS — Z41.9 ENCOUNTER FOR PROCEDURE FOR PURPOSES OTHER THAN REMEDYING HEALTH STATE, UNSPECIFIED: ICD-10-CM

## 2022-11-16 PROCEDURE — ? GENTLEMAX

## 2022-11-16 ASSESSMENT — LOCATION ZONE DERM: LOCATION ZONE: LEG

## 2022-11-16 ASSESSMENT — LOCATION DETAILED DESCRIPTION DERM
LOCATION DETAILED: LEFT ANTERIOR PROXIMAL THIGH
LOCATION DETAILED: RIGHT ANTERIOR PROXIMAL THIGH

## 2022-11-16 ASSESSMENT — LOCATION SIMPLE DESCRIPTION DERM
LOCATION SIMPLE: RIGHT THIGH
LOCATION SIMPLE: LEFT THIGH

## 2022-11-16 NOTE — PROCEDURE: GENTLEMAX
Price (Use Numbers Only, No Special Characters Or $): 018 Price (Use Numbers Only, No Special Characters Or $): 975

## 2022-11-20 PROBLEM — Z12.11 ENCOUNTER FOR SCREENING COLONOSCOPY: Status: RESOLVED | Noted: 2022-10-21 | Resolved: 2022-11-20

## 2022-12-12 RX ORDER — VALACYCLOVIR HYDROCHLORIDE 1 G/1
1 TABLET, FILM COATED ORAL AS NEEDED
Qty: 30 TABLET | Refills: 1 | Status: SHIPPED | OUTPATIENT
Start: 2022-12-12

## 2022-12-12 NOTE — TELEPHONE ENCOUNTER
As of last office note (11/11/2022), patient is still taking this medication and will be out of medication by their appointment (02/17/2023). Pended medication has correct quantity and pended to preferred pharmacy. Request submited to Dr. Fred Moise (On Call Physician) in absence of Jose Abad (PCP).

## 2023-02-17 ENCOUNTER — TELEMEDICINE (OUTPATIENT)
Dept: INTERNAL MEDICINE CLINIC | Facility: CLINIC | Age: 48
End: 2023-02-17
Payer: COMMERCIAL

## 2023-02-17 DIAGNOSIS — G47.9 SLEEP DISTURBANCE: Primary | ICD-10-CM

## 2023-02-17 DIAGNOSIS — F43.9 STRESS: ICD-10-CM

## 2023-02-17 DIAGNOSIS — F41.9 ANXIETY: ICD-10-CM

## 2023-02-17 PROCEDURE — 99213 OFFICE O/P EST LOW 20 MIN: CPT | Performed by: INTERNAL MEDICINE

## 2023-02-17 RX ORDER — ESZOPICLONE 2 MG/1
2 TABLET, FILM COATED ORAL NIGHTLY
COMMUNITY
End: 2023-02-17 | Stop reason: SDUPTHER

## 2023-02-17 RX ORDER — ESZOPICLONE 2 MG/1
2 TABLET, FILM COATED ORAL NIGHTLY
Qty: 90 TABLET | Refills: 0 | Status: SHIPPED | OUTPATIENT
Start: 2023-02-17 | End: 2023-05-18

## 2023-02-17 SDOH — ECONOMIC STABILITY: HOUSING INSECURITY
IN THE LAST 12 MONTHS, WAS THERE A TIME WHEN YOU DID NOT HAVE A STEADY PLACE TO SLEEP OR SLEPT IN A SHELTER (INCLUDING NOW)?: NO

## 2023-02-17 SDOH — ECONOMIC STABILITY: INCOME INSECURITY: HOW HARD IS IT FOR YOU TO PAY FOR THE VERY BASICS LIKE FOOD, HOUSING, MEDICAL CARE, AND HEATING?: NOT HARD AT ALL

## 2023-02-17 SDOH — ECONOMIC STABILITY: FOOD INSECURITY: WITHIN THE PAST 12 MONTHS, YOU WORRIED THAT YOUR FOOD WOULD RUN OUT BEFORE YOU GOT MONEY TO BUY MORE.: NEVER TRUE

## 2023-02-17 SDOH — ECONOMIC STABILITY: FOOD INSECURITY: WITHIN THE PAST 12 MONTHS, THE FOOD YOU BOUGHT JUST DIDN'T LAST AND YOU DIDN'T HAVE MONEY TO GET MORE.: NEVER TRUE

## 2023-02-17 ASSESSMENT — ANXIETY QUESTIONNAIRES
3. WORRYING TOO MUCH ABOUT DIFFERENT THINGS: 0
4. TROUBLE RELAXING: 0
6. BECOMING EASILY ANNOYED OR IRRITABLE: 0
IF YOU CHECKED OFF ANY PROBLEMS ON THIS QUESTIONNAIRE, HOW DIFFICULT HAVE THESE PROBLEMS MADE IT FOR YOU TO DO YOUR WORK, TAKE CARE OF THINGS AT HOME, OR GET ALONG WITH OTHER PEOPLE: NOT DIFFICULT AT ALL
GAD7 TOTAL SCORE: 0
1. FEELING NERVOUS, ANXIOUS, OR ON EDGE: 0
7. FEELING AFRAID AS IF SOMETHING AWFUL MIGHT HAPPEN: 0
5. BEING SO RESTLESS THAT IT IS HARD TO SIT STILL: 0
2. NOT BEING ABLE TO STOP OR CONTROL WORRYING: 0

## 2023-02-17 ASSESSMENT — ENCOUNTER SYMPTOMS
RESPIRATORY NEGATIVE: 1
GASTROINTESTINAL NEGATIVE: 1
EYES NEGATIVE: 1

## 2023-02-17 ASSESSMENT — PATIENT HEALTH QUESTIONNAIRE - PHQ9
SUM OF ALL RESPONSES TO PHQ QUESTIONS 1-9: 0
SUM OF ALL RESPONSES TO PHQ9 QUESTIONS 1 & 2: 0
1. LITTLE INTEREST OR PLEASURE IN DOING THINGS: 0
2. FEELING DOWN, DEPRESSED OR HOPELESS: 0
SUM OF ALL RESPONSES TO PHQ QUESTIONS 1-9: 0
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
## 2023-05-17 ENCOUNTER — TELEMEDICINE (OUTPATIENT)
Dept: INTERNAL MEDICINE CLINIC | Facility: CLINIC | Age: 48
End: 2023-05-17
Payer: COMMERCIAL

## 2023-05-17 DIAGNOSIS — G47.9 SLEEP DISTURBANCE: Primary | ICD-10-CM

## 2023-05-17 DIAGNOSIS — B00.9 HERPES SIMPLEX TYPE 1 INFECTION: ICD-10-CM

## 2023-05-17 DIAGNOSIS — F41.9 ANXIETY: ICD-10-CM

## 2023-05-17 PROCEDURE — 99214 OFFICE O/P EST MOD 30 MIN: CPT | Performed by: INTERNAL MEDICINE

## 2023-05-17 RX ORDER — ALPRAZOLAM 1 MG/1
1 TABLET ORAL NIGHTLY PRN
Qty: 30 TABLET | Refills: 2 | Status: SHIPPED | OUTPATIENT
Start: 2023-05-17 | End: 2023-08-15

## 2023-05-17 RX ORDER — ESZOPICLONE 2 MG/1
2 TABLET, FILM COATED ORAL NIGHTLY
Qty: 90 TABLET | Refills: 0 | Status: SHIPPED | OUTPATIENT
Start: 2023-05-17 | End: 2023-08-15

## 2023-05-17 RX ORDER — VALACYCLOVIR HYDROCHLORIDE 1 G/1
1 TABLET, FILM COATED ORAL 2 TIMES DAILY
Qty: 20 TABLET | Refills: 0 | Status: SHIPPED | OUTPATIENT
Start: 2023-05-17 | End: 2023-05-27

## 2023-05-17 SDOH — ECONOMIC STABILITY: INCOME INSECURITY: HOW HARD IS IT FOR YOU TO PAY FOR THE VERY BASICS LIKE FOOD, HOUSING, MEDICAL CARE, AND HEATING?: NOT HARD AT ALL

## 2023-05-17 SDOH — ECONOMIC STABILITY: FOOD INSECURITY: WITHIN THE PAST 12 MONTHS, THE FOOD YOU BOUGHT JUST DIDN'T LAST AND YOU DIDN'T HAVE MONEY TO GET MORE.: NEVER TRUE

## 2023-05-17 SDOH — ECONOMIC STABILITY: FOOD INSECURITY: WITHIN THE PAST 12 MONTHS, YOU WORRIED THAT YOUR FOOD WOULD RUN OUT BEFORE YOU GOT MONEY TO BUY MORE.: NEVER TRUE

## 2023-05-17 ASSESSMENT — PATIENT HEALTH QUESTIONNAIRE - PHQ9
SUM OF ALL RESPONSES TO PHQ QUESTIONS 1-9: 0
1. LITTLE INTEREST OR PLEASURE IN DOING THINGS: 0
SUM OF ALL RESPONSES TO PHQ9 QUESTIONS 1 & 2: 0
SUM OF ALL RESPONSES TO PHQ QUESTIONS 1-9: 0
2. FEELING DOWN, DEPRESSED OR HOPELESS: 0

## 2023-05-17 ASSESSMENT — ANXIETY QUESTIONNAIRES
7. FEELING AFRAID AS IF SOMETHING AWFUL MIGHT HAPPEN: 0
IF YOU CHECKED OFF ANY PROBLEMS ON THIS QUESTIONNAIRE, HOW DIFFICULT HAVE THESE PROBLEMS MADE IT FOR YOU TO DO YOUR WORK, TAKE CARE OF THINGS AT HOME, OR GET ALONG WITH OTHER PEOPLE: NOT DIFFICULT AT ALL
5. BEING SO RESTLESS THAT IT IS HARD TO SIT STILL: 0
1. FEELING NERVOUS, ANXIOUS, OR ON EDGE: 0
3. WORRYING TOO MUCH ABOUT DIFFERENT THINGS: 0
GAD7 TOTAL SCORE: 0
4. TROUBLE RELAXING: 0
2. NOT BEING ABLE TO STOP OR CONTROL WORRYING: 0
6. BECOMING EASILY ANNOYED OR IRRITABLE: 0

## 2023-05-17 NOTE — PROGRESS NOTES
at all   Food Insecurity: No Food Insecurity    Worried About [address] in the Last Year: Never true    Ran Out of Food in the Last Year: Never true   Transportation Needs: Unknown    Lack of Transportation (Medical): Not on file    Lack of Transportation (Non-Medical): No   Physical Activity: Not on file   Stress: Not on file   Social Connections: Not on file   Intimate Partner Violence: Not on file   Housing Stability: Unknown    Unable to Pay for Housing in the Last Year: Not on file    Number of Places Lived in the Last Year: Not on file    Unstable Housing in the Last Year: No         Current Outpatient Medications   Medication Sig    eszopiclone (LUNESTA) 2 MG TABS Take 1 tablet by mouth nightly for 90 days. Max Daily Amount: 2 mg    ALPRAZolam (XANAX) 1 MG tablet Take 1 tablet by mouth nightly as needed for Sleep for up to 90 days. Max Daily Amount: 1 mg    valACYclovir (VALTREX) 1 g tablet Take 1 tablet by mouth 2 times daily for 10 days Take 1 tablet by mouth 2 times daily for 2 days for acute cold sores. fluocinonide (LIDEX) 0.05 % external solution     Multiple Vitamin (MULTIVITAMIN ADULT PO) Take 1 tablet by mouth daily (Patient not taking: Reported on 2/17/2023)     No current facility-administered medications for this visit. Objective     There were no vitals filed for this visit. Physical Exam:  Constitutional: Appears well kempt. Alert/oriented x3. In no acute distress. Head: Normocephalic No trauma. Psychiatric: Normal thought content. Normal behavior. Normal judgment.      Recommendations     Assessment:  Patient Active Problem List   Diagnosis    Psoriasis    Sleep disturbance    Anxiety    Strabismus    Stress      Plan:  Anxiety and insomnia/trouble sleeping  -Lunesta 2 mg 1 tablet nightly as needed    Anxiety/increased stress levels  -xanax 1 mg PRN  -Continue therapy    Herpes simplex 1/cold sores  -Valtrex 1000 mg twice daily for 2 days when she has flareups    History

## 2023-05-31 DIAGNOSIS — G47.9 SLEEP DISTURBANCE: ICD-10-CM

## 2023-05-31 DIAGNOSIS — F41.9 ANXIETY: ICD-10-CM

## 2023-05-31 RX ORDER — VALACYCLOVIR HYDROCHLORIDE 1 G/1
1000 TABLET, FILM COATED ORAL 2 TIMES DAILY
Qty: 20 TABLET | Refills: 0 | Status: SHIPPED | OUTPATIENT
Start: 2023-05-31 | End: 2023-06-10

## 2023-05-31 RX ORDER — ALPRAZOLAM 1 MG/1
1 TABLET ORAL NIGHTLY PRN
Qty: 30 TABLET | Refills: 0 | Status: SHIPPED | OUTPATIENT
Start: 2023-05-31 | End: 2023-06-30

## 2023-05-31 RX ORDER — ESZOPICLONE 2 MG/1
2 TABLET, FILM COATED ORAL NIGHTLY
Qty: 30 TABLET | Refills: 0 | Status: SHIPPED | OUTPATIENT
Start: 2023-05-31 | End: 2023-06-30

## 2023-05-31 RX ORDER — VALACYCLOVIR HYDROCHLORIDE 1 G/1
1000 TABLET, FILM COATED ORAL 2 TIMES DAILY
COMMUNITY
End: 2023-05-31 | Stop reason: SDUPTHER

## 2023-05-31 NOTE — TELEPHONE ENCOUNTER
Spoke with preferred local pharmacy and cancelled the recent prescriptions sent on 05/17/2023 of [address] [facility]. Patient notified that we can submit the request for medication to the out of state pharmacy, however that pharmacy can still decline to fill the prescription. Patient disconnected call.

## 2023-05-31 NOTE — TELEPHONE ENCOUNTER
----- Message from [facility] sent at 5/30/2023 10:35 AM EDT -----  Subject: Refill Request    QUESTIONS  Name of Medication? eszopiclone (LUNESTA) 2 MG TABS  Patient-reported dosage and instructions? as needed   How many days do you have left? 0  Preferred Pharmacy? [id number]  Pharmacy phone number (if available)? [phone]  ---------------------------------------------------------------------------  --------------,  Name of Medication? ALPRAZolam (XANAX) 1 MG tablet  Patient-reported dosage and instructions? as needed   How many days do you have left? 0  Preferred Pharmacy? [id number]  Pharmacy phone number (if available)? [phone]  ---------------------------------------------------------------------------  --------------,  Name of Medication? Other - valtrax  Patient-reported dosage and instructions? as needed   How many days do you have left? 0  Preferred Pharmacy? [id number]  Pharmacy phone number (if available)? [phone]  ---------------------------------------------------------------------------  --------------  CALL BACK INFO  What is the best way for the office to contact you? OK to leave message on   voicemail  Preferred Call Back Phone Number? [phone]  ---------------------------------------------------------------------------  --------------  SCRIPT ANSWERS  Relationship to Patient?  Self

## 2023-09-12 ENCOUNTER — TELEPHONE (OUTPATIENT)
Dept: INTERNAL MEDICINE CLINIC | Facility: CLINIC | Age: 48
End: 2023-09-12

## 2023-09-12 NOTE — TELEPHONE ENCOUNTER
----- Message from Eber Santacruz sent at 9/12/2023 11:29 AM EDT -----  Subject: Medication Problem    Medication: eszopiclone (LUNESTA) 2 MG TABS  Dosage: 2 MG one a day nightly   Ordering Provider: Lashawn Chaparro     Question/Problem: PT called and Jennifer Church is not covered under Insurance   unless they receive notice from Doctor stating its needed to have 30 at a   time. PT is asking if Doctor could contact Insurance. [employer] is the new   insurance.  Providers Call for Insurance [phone]      Pharmacy: [address] [place] [address], [address] [facility] [address] [phone]    ---------------------------------------------------------------------------  --------------  Harriett Dominguez INFO  [phone]; OK to leave message on voicemail  ---------------------------------------------------------------------------  --------------    SCRIPT ANSWERS  Relationship to Patient: Self

## 2023-09-14 NOTE — TELEPHONE ENCOUNTER
Spoke with patient and notified her that we will do a Prior Authorization. Patient verbally expressed understanding and denied all additional questions, comments and/or concerns.

## 2023-09-20 NOTE — TELEPHONE ENCOUNTER
Prior Authorization has been electronically submitted to patient insurance carrier via Covermymeds.     Key: [id number] - PA Case ID: [id number]

## 2023-09-25 NOTE — TELEPHONE ENCOUNTER
Attempt to notify patient that according to her insurance carrier the Prior Authorization for Lunesta 2 MG has been denied via Covermymeds, however patient was unavailable. Detailed voicemail was left at which time patient can return call if they have any questions, comments and/or concerns. Your PA request has been denied. Additional information will be provided in the denial communication.

## 2023-10-03 ENCOUNTER — TELEPHONE (OUTPATIENT)
Dept: INTERNAL MEDICINE CLINIC | Facility: CLINIC | Age: 48
End: 2023-10-03

## 2023-10-03 NOTE — TELEPHONE ENCOUNTER
Received fax from [phone] (patient insurance carrier) that the appeal for the patients LUNESTA 2 MG has been approved:    09/29/2023-09/29/2026

## 2023-10-20 ENCOUNTER — TELEPHONE (OUTPATIENT)
Dept: INTERNAL MEDICINE CLINIC | Facility: CLINIC | Age: 48
End: 2023-10-20

## 2023-10-20 NOTE — TELEPHONE ENCOUNTER
Patient is upset that nobody reached out to her to tell her that the PA needed for Lunesta had been approved, she has heard nothing from the office or the insurance company. Patient is also is need of a new Lunesta prescription, to be sent to [facility] on [address].   Patient last seen 5-17-23 - televisit              Next OV 10-24-23- My Chart visit

## 2023-10-24 ENCOUNTER — TELEMEDICINE (OUTPATIENT)
Dept: INTERNAL MEDICINE CLINIC | Facility: CLINIC | Age: 48
End: 2023-10-24
Payer: COMMERCIAL

## 2023-10-24 DIAGNOSIS — L40.9 PSORIASIS: ICD-10-CM

## 2023-10-24 DIAGNOSIS — F41.9 ANXIETY: Primary | ICD-10-CM

## 2023-10-24 DIAGNOSIS — H50.9 STRABISMUS: ICD-10-CM

## 2023-10-24 DIAGNOSIS — F43.9 STRESS: ICD-10-CM

## 2023-10-24 DIAGNOSIS — B00.9 HERPES SIMPLEX TYPE 1 INFECTION: ICD-10-CM

## 2023-10-24 DIAGNOSIS — G47.9 SLEEP DISTURBANCE: ICD-10-CM

## 2023-10-24 DIAGNOSIS — Z01.419 ENCOUNTER FOR GYNECOLOGICAL EXAMINATION: ICD-10-CM

## 2023-10-24 PROCEDURE — 99214 OFFICE O/P EST MOD 30 MIN: CPT | Performed by: INTERNAL MEDICINE

## 2023-10-24 RX ORDER — ALPRAZOLAM 1 MG/1
1 TABLET ORAL NIGHTLY PRN
Qty: 30 TABLET | Refills: 2 | Status: SHIPPED | OUTPATIENT
Start: 2023-10-24 | End: 2024-01-22

## 2023-10-24 RX ORDER — ALPRAZOLAM 1 MG/1
TABLET ORAL
COMMUNITY
Start: 2023-09-11 | End: 2023-10-24 | Stop reason: SDUPTHER

## 2023-10-24 RX ORDER — ESZOPICLONE 2 MG/1
2 TABLET, FILM COATED ORAL NIGHTLY
COMMUNITY

## 2023-10-24 SDOH — ECONOMIC STABILITY: INCOME INSECURITY: HOW HARD IS IT FOR YOU TO PAY FOR THE VERY BASICS LIKE FOOD, HOUSING, MEDICAL CARE, AND HEATING?: NOT HARD AT ALL

## 2023-10-24 SDOH — ECONOMIC STABILITY: FOOD INSECURITY: WITHIN THE PAST 12 MONTHS, YOU WORRIED THAT YOUR FOOD WOULD RUN OUT BEFORE YOU GOT MONEY TO BUY MORE.: NEVER TRUE

## 2023-10-24 SDOH — ECONOMIC STABILITY: FOOD INSECURITY: WITHIN THE PAST 12 MONTHS, THE FOOD YOU BOUGHT JUST DIDN'T LAST AND YOU DIDN'T HAVE MONEY TO GET MORE.: NEVER TRUE

## 2023-10-24 ASSESSMENT — PATIENT HEALTH QUESTIONNAIRE - PHQ9
SUM OF ALL RESPONSES TO PHQ QUESTIONS 1-9: 0
SUM OF ALL RESPONSES TO PHQ9 QUESTIONS 1 & 2: 0
1. LITTLE INTEREST OR PLEASURE IN DOING THINGS: 0
SUM OF ALL RESPONSES TO PHQ QUESTIONS 1-9: 0
2. FEELING DOWN, DEPRESSED OR HOPELESS: 0

## 2023-10-24 ASSESSMENT — ANXIETY QUESTIONNAIRES
IF YOU CHECKED OFF ANY PROBLEMS ON THIS QUESTIONNAIRE, HOW DIFFICULT HAVE THESE PROBLEMS MADE IT FOR YOU TO DO YOUR WORK, TAKE CARE OF THINGS AT HOME, OR GET ALONG WITH OTHER PEOPLE: NOT DIFFICULT AT ALL
3. WORRYING TOO MUCH ABOUT DIFFERENT THINGS: 0
4. TROUBLE RELAXING: 0
6. BECOMING EASILY ANNOYED OR IRRITABLE: 0
5. BEING SO RESTLESS THAT IT IS HARD TO SIT STILL: 0
7. FEELING AFRAID AS IF SOMETHING AWFUL MIGHT HAPPEN: 0
2. NOT BEING ABLE TO STOP OR CONTROL WORRYING: 0
GAD7 TOTAL SCORE: 0
1. FEELING NERVOUS, ANXIOUS, OR ON EDGE: 0

## 2023-10-24 NOTE — PROGRESS NOTES
[place].    Review of Systems   All other systems reviewed and are negative. Past Medical History:   Diagnosis Date    History of kidney stones     X1 with surgical intervention    Psoriasis     Strabismus 09/20/2022     Social History     Socioeconomic History    Marital status: Single     Spouse name: Not on file    Number of children: Not on file    Years of education: Not on file    Highest education level: Not on file   Occupational History    Not on file   Tobacco Use    Smoking status: Never    Smokeless tobacco: Never   Vaping Use    Vaping Use: Never used   Substance and Sexual Activity    Alcohol use: Yes     Alcohol/week: 5.0 standard drinks of alcohol     Types: 5 Glasses of wine per week    Drug use: Never    Sexual activity: Not on file   Other Topics Concern    Not on file   Social History Narrative    Not on file     Social Determinants of Health     Financial Resource Strain: Low Risk  (10/24/2023)    Overall Financial Resource Strain (CARDIA)     Difficulty of Paying Living Expenses: Not hard at all   Food Insecurity: No Food Insecurity (10/24/2023)    Hunger Vital Sign     Worried About Running Out of Food in the Last Year: Never true     [address] in the Last Year: Never true   Transportation Needs: Unknown (10/24/2023)    PRAPARE - Transportation     Lack of Transportation (Medical): Not on file     Lack of Transportation (Non-Medical): No   Physical Activity: Not on file   Stress: Not on file   Social Connections: Not on file   Intimate Partner Violence: Not on file   Housing Stability: Unknown (10/24/2023)    Housing Stability Vital Sign     Unable to Pay for Housing in the Last Year: Not on file     Number of Places Lived in the Last Year: Not on file     Unstable Housing in the Last Year: No         Current Outpatient Medications   Medication Sig    ALPRAZolam (XANAX) 1 MG tablet Take 1 tablet by mouth nightly as needed for Sleep for up to 90 days.  Max Daily Amount: 1 mg

## 2023-11-20 DIAGNOSIS — G47.9 SLEEP DISORDER, UNSPECIFIED: ICD-10-CM

## 2023-11-20 RX ORDER — ESZOPICLONE 2 MG/1
TABLET, FILM COATED ORAL
Qty: 30 TABLET | Refills: 2 | Status: SHIPPED | OUTPATIENT
Start: 2023-11-20 | End: 2024-02-18

## 2023-11-20 NOTE — TELEPHONE ENCOUNTER
As of last office note (10/24/2023), patient is still taking this medication and will be out of medication by their appointment (04/24/2024). Pended medication has correct quantity and pended to preferred pharmacy.

## 2023-12-11 ENCOUNTER — OFFICE VISIT (OUTPATIENT)
Dept: OBGYN CLINIC | Age: 48
End: 2023-12-11
Payer: COMMERCIAL

## 2023-12-11 VITALS
WEIGHT: 109 LBS | BODY MASS INDEX: 17.52 KG/M2 | DIASTOLIC BLOOD PRESSURE: 60 MMHG | SYSTOLIC BLOOD PRESSURE: 102 MMHG | HEIGHT: 66 IN

## 2023-12-11 DIAGNOSIS — Z12.31 SCREENING MAMMOGRAM FOR BREAST CANCER: ICD-10-CM

## 2023-12-11 DIAGNOSIS — Z98.890 HISTORY OF MYOMECTOMY: ICD-10-CM

## 2023-12-11 DIAGNOSIS — Z01.419 WOMEN'S ANNUAL ROUTINE GYNECOLOGICAL EXAMINATION: Primary | ICD-10-CM

## 2023-12-11 PROCEDURE — 99386 PREV VISIT NEW AGE 40-64: CPT | Performed by: OBSTETRICS & GYNECOLOGY

## 2023-12-12 ASSESSMENT — ENCOUNTER SYMPTOMS
RESPIRATORY NEGATIVE: 1
GASTROINTESTINAL NEGATIVE: 1

## 2023-12-23 RX ORDER — FLUCONAZOLE 150 MG/1
150 TABLET ORAL ONCE
Qty: 1 TABLET | Refills: 0 | Status: SHIPPED | OUTPATIENT
Start: 2023-12-23 | End: 2023-12-23

## 2024-01-29 NOTE — PROGRESS NOTES
Colpo Note  49 y.o.  presents for colposcopy. 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  She reports history of previous abnormal paps in  with [facility]. At the time she didn't get a colposcopy but got an EMB that was normal  Denies postcoital bleeding or pelvic pain. No complaints.     BP (!) 100/58   Ht 1.676 m (5' 6\")   Wt 48.5 kg (107 lb)   BMI 17.27 kg/m²     Physical Exam    Colposcopy:   Consent obtained and timeout performed. Speculum inserted into vagina, cervix visualized and coated with acetic acid solution. Cervix and vagina inspected with colposcope. TZ was completely visualized. No abnormalities seen, one biopsy takes at 6 oclock. ECC was performed. Hemostasis achieved with pressure and silver nitrate. Pt tolerated procedure well.    A/P: 49 y.o.  with BMI 17, colpo due to HPV 16 positive  -- adequate colpo  -- overall impression: normal  -- bleeding precautions given  -- will call pt with pathology results

## 2024-01-31 ENCOUNTER — PROCEDURE VISIT (OUTPATIENT)
Dept: OBGYN CLINIC | Age: 49
End: 2024-01-31
Payer: COMMERCIAL

## 2024-01-31 VITALS
HEIGHT: 66 IN | SYSTOLIC BLOOD PRESSURE: 100 MMHG | WEIGHT: 107 LBS | DIASTOLIC BLOOD PRESSURE: 58 MMHG | BODY MASS INDEX: 17.19 KG/M2

## 2024-01-31 DIAGNOSIS — R87.810 CERVICAL HIGH RISK HPV (HUMAN PAPILLOMAVIRUS) TEST POSITIVE: ICD-10-CM

## 2024-01-31 DIAGNOSIS — R87.810 CERVICAL HIGH RISK HPV (HUMAN PAPILLOMAVIRUS) TEST POSITIVE: Primary | ICD-10-CM

## 2024-01-31 LAB
HCG, PREGNANCY, URINE, POC: NEGATIVE
VALID INTERNAL CONTROL, POC: YES

## 2024-01-31 PROCEDURE — 99213 OFFICE O/P EST LOW 20 MIN: CPT | Performed by: OBSTETRICS & GYNECOLOGY

## 2024-01-31 PROCEDURE — 57454 BX/CURETT OF CERVIX W/SCOPE: CPT | Performed by: OBSTETRICS & GYNECOLOGY

## 2024-01-31 PROCEDURE — 81025 URINE PREGNANCY TEST: CPT | Performed by: OBSTETRICS & GYNECOLOGY

## 2024-01-31 RX ORDER — ALPRAZOLAM 0.5 MG/1
0.5 TABLET ORAL NIGHTLY PRN
COMMUNITY

## 2024-01-31 ASSESSMENT — PATIENT HEALTH QUESTIONNAIRE - PHQ9
SUM OF ALL RESPONSES TO PHQ QUESTIONS 1-9: 0
SUM OF ALL RESPONSES TO PHQ9 QUESTIONS 1 & 2: 0
SUM OF ALL RESPONSES TO PHQ QUESTIONS 1-9: 0
1. LITTLE INTEREST OR PLEASURE IN DOING THINGS: 0
2. FEELING DOWN, DEPRESSED OR HOPELESS: 0
SUM OF ALL RESPONSES TO PHQ QUESTIONS 1-9: 0
SUM OF ALL RESPONSES TO PHQ QUESTIONS 1-9: 0

## 2024-02-06 ENCOUNTER — TELEPHONE (OUTPATIENT)
Dept: OBGYN CLINIC | Age: 49
End: 2024-02-06

## 2024-02-06 LAB
C TRACH RRNA CVX QL NAA+PROBE: NEGATIVE
COLLECTION METHOD: NORMAL
CYTOLOGIST CVX/VAG CYTO: NORMAL
CYTOLOGY CVX/VAG DOC THIN PREP: NORMAL
DATE OF LMP: NORMAL
HPV APTIMA: NEGATIVE
HPV GENOTYPE REFLEX: NORMAL
Lab: NORMAL
Lab: NORMAL
N GONORRHOEA RRNA CVX QL NAA+PROBE: NEGATIVE
OTHER PT INFO: NORMAL
PAP SOURCE: NORMAL
PATH REPORT.FINAL DX SPEC: NORMAL
PREV CYTO INFO: NORMAL
PREV TREATMENT RESULTS: NORMAL
PREV TREATMENT: NORMAL
STAT OF ADQ CVX/VAG CYTO-IMP: NORMAL
T VAGINALIS RRNA SPEC QL NAA+PROBE: NEGATIVE

## 2024-02-06 NOTE — TELEPHONE ENCOUNTER
Spoke with patient via telephone. Discussed neg Colpo and ECC results. Intsructed patient that Dr Davison recommends following up with PAP in 1 year.

## 2024-05-07 ENCOUNTER — TELEPHONE (OUTPATIENT)
Dept: OBGYN CLINIC | Age: 49
End: 2024-05-07

## 2024-05-07 NOTE — TELEPHONE ENCOUNTER
Pt called and wants to see [facility] regarding hormone replacement. Pt states she needs to see her before the end of the month due to her insurance. I don't see an open spot to schedule other than [facility] on 5/30/24. Please call patient and let her know if she can be seen in one of these spots.. She can be reached at [phone]   Thank you       Called pt back on the [phone] number and it would not connect so I called her other number on file and left a vm for her to call the office.....   LC said it was OK to take one of the [facility] spots on 5/30 to schedule this appt.

## 2024-05-16 ENCOUNTER — OFFICE VISIT (OUTPATIENT)
Dept: OBGYN CLINIC | Age: 49
End: 2024-05-16

## 2024-05-16 VITALS
WEIGHT: 109 LBS | BODY MASS INDEX: 17.52 KG/M2 | DIASTOLIC BLOOD PRESSURE: 60 MMHG | SYSTOLIC BLOOD PRESSURE: 92 MMHG | HEIGHT: 66 IN

## 2024-05-16 DIAGNOSIS — N95.1 PERIMENOPAUSAL: Primary | ICD-10-CM

## 2024-05-16 DIAGNOSIS — Z86.59 HISTORY OF RECENT STRESSFUL LIFE EVENT: ICD-10-CM

## 2024-05-16 RX ORDER — ESZOPICLONE 2 MG/1
TABLET, FILM COATED ORAL
COMMUNITY
Start: 2024-05-15

## 2024-05-16 RX ORDER — DEXTROAMPHETAMINE SACCHARATE, AMPHETAMINE ASPARTATE, DEXTROAMPHETAMINE SULFATE AND AMPHETAMINE SULFATE 1.25; 1.25; 1.25; 1.25 MG/1; MG/1; MG/1; MG/1
TABLET ORAL
COMMUNITY
Start: 2024-05-15

## 2024-05-16 RX ORDER — DESOGESTREL AND ETHINYL ESTRADIOL 21-5 (28)
1 KIT ORAL DAILY
Qty: 3 PACKET | Refills: 3 | Status: SHIPPED | OUTPATIENT
Start: 2024-05-16

## 2024-05-16 ASSESSMENT — ENCOUNTER SYMPTOMS
RESPIRATORY NEGATIVE: 1
GASTROINTESTINAL NEGATIVE: 1
EYES NEGATIVE: 1
ALLERGIC/IMMUNOLOGIC NEGATIVE: 1

## 2024-05-16 NOTE — PROGRESS NOTES
Name: Michelle Oswald  Date: 2024  YOB: 1975  LMP: Patient's last menstrual period was 2024.      Michelle is a 49 y.o.   who is here for a  problem visit to discuss several issues.    She is going through a difficult divorce.  Her  left her for a woman 1/2 her age and is requiring the frozen embryos to be destroyed.   Menses starting to change. Skip 2 periods. Feels menopausal.  Egg retrieval at 48, also myomectomy.  She would like a note saying that she is unsafe to carry a pregnancy due to previous uterine surgery.     Michelle  reports that she is not currently sexually active and has had partner(s) who are male.  The current method of family planning is none.     Review of Systems   Constitutional: Negative.    HENT: Negative.     Eyes: Negative.    Respiratory: Negative.     Cardiovascular: Negative.    Gastrointestinal: Negative.    Endocrine: Negative.    Genitourinary: Negative.    Musculoskeletal: Negative.    Skin: Negative.    Allergic/Immunologic: Negative.    Neurological: Negative.    Hematological: Negative.    Psychiatric/Behavioral: Negative.         Menstrual status: irregular cycles  Gyn Surgery: s/p cystoscopy, myomectomy    Pap History:  Diagnosis:   Date Value Ref Range Status   2024 Comment   Final     Comment:     (NOTE)  UNSATISFACTORY FOR EVALUATION.     2023 Comment   Final     Comment:     (NOTE)  NEGATIVE FOR INTRAEPITHELIAL LESION OR MALIGNANCY.  FUNGAL ORGANISMS MORPHOLOGICALLY CONSISTENT WITH CANDIDA SPECIES ARE  PRESENT.       HPV Aptima   Date Value Ref Range Status   2024 Negative Negative   Final     Comment:     (NOTE)  This nucleic acid amplification test detects fourteen high-risk  HPV types (16,18,31,33,35,39,45,51,52,56,58,59,66,68) without  differentiation.     2023 Positive (A) Negative   Final     Comment:     (NOTE)  This nucleic acid amplification test detects fourteen high-risk  HPV types

## 2024-05-30 ENCOUNTER — CLINICAL DOCUMENTATION (OUTPATIENT)
Dept: OBGYN CLINIC | Age: 49
End: 2024-05-30

## 2024-11-26 ENCOUNTER — TELEPHONE (OUTPATIENT)
Dept: INTERNAL MEDICINE CLINIC | Facility: CLINIC | Age: 49
End: 2024-11-26

## 2024-11-26 NOTE — TELEPHONE ENCOUNTER
----- Message from Wesley CARRINGTON sent at 11/25/2024  2:47 PM EST -----  Regarding: ECC Appointment Request  ECC Appointment Request    Patient needs appointment for ECC Appointment Type: Pre-Op Visit.    Patient Requested Dates(s): December 2024 (Before 14th of December 2024)  Patient Requested Time: any time  Provider Name: Alon Bolden DO    Reason for Appointment Request: Established Patient - Available appointments did not meet patient need. Ms. Oswald will be having an eye surgery but she will be on out of town by December 13, 2024 so if it's okay to fit her in before December 14 2024 since her surgery will be on December 23, 2024 and she's very flexible of her time so any time of the appointment will do.  --------------------------------------------------------------------------------------------------------------------------    Relationship to Patient: Self     Call Back Information: OK to leave message on voicemail  Preferred Call Back Number: Phone  [phone]

## 2024-11-26 NOTE — TELEPHONE ENCOUNTER
----- Message from Wesley CARRINGTON sent at 11/25/2024  2:47 PM EST -----  Regarding: [facility] Appointment Request  [facility] Appointment Request    Patient needs appointment for [facility] Appointment Type: Pre-Op Visit.    Patient Requested Dates(s): December 2024 (Before 14th of December 2024)  Patient Requested Time: any time  Provider Name: Alon Bolden DO    Reason for Appointment Request: Established Patient - Available appointments did not meet patient need. Ms. Oswald will be having an eye surgery but she will be on out of town by December 13, 2024 so if it's okay to fit her in before December 14 2024 since her surgery will be on December 23, 2024 and she's very flexible of her time so any time of the appointment will do.  --------------------------------------------------------------------------------------------------------------------------    Relationship to Patient: Self     Call Back Information: OK to leave message on voicemail  Preferred Call Back Number: Phone  [phone]

## 2024-11-26 NOTE — TELEPHONE ENCOUNTER
Left message for patient to please return my call, we will be happy to help her schedule an appointment for her .

## 2024-11-26 NOTE — TELEPHONE ENCOUNTER
Left message for patient to please call the office back, concerning the pre op appointment she wants to schedule.

## 2024-12-09 ENCOUNTER — OFFICE VISIT (OUTPATIENT)
Dept: INTERNAL MEDICINE CLINIC | Facility: CLINIC | Age: 49
End: 2024-12-09
Payer: COMMERCIAL

## 2024-12-09 VITALS
BODY MASS INDEX: 17.84 KG/M2 | HEIGHT: 66 IN | HEART RATE: 76 BPM | OXYGEN SATURATION: 100 % | SYSTOLIC BLOOD PRESSURE: 106 MMHG | WEIGHT: 111 LBS | DIASTOLIC BLOOD PRESSURE: 58 MMHG

## 2024-12-09 DIAGNOSIS — Z13.220 ENCOUNTER FOR SCREENING FOR LIPID DISORDER: ICD-10-CM

## 2024-12-09 DIAGNOSIS — Z01.818 PREOP EXAMINATION: ICD-10-CM

## 2024-12-09 DIAGNOSIS — L40.9 PSORIASIS: Primary | ICD-10-CM

## 2024-12-09 LAB
BASOPHILS # BLD: 0.1 K/UL (ref 0–0.2)
BASOPHILS NFR BLD: 2 % (ref 0–2)
DIFFERENTIAL METHOD BLD: NORMAL
EOSINOPHIL # BLD: 0.1 K/UL (ref 0–0.8)
EOSINOPHIL NFR BLD: 3 % (ref 0.5–7.8)
ERYTHROCYTE [DISTWIDTH] IN BLOOD BY AUTOMATED COUNT: 13 % (ref 11.9–14.6)
HCT VFR BLD AUTO: 39.2 % (ref 35.8–46.3)
HGB BLD-MCNC: 13 G/DL (ref 11.7–15.4)
IMM GRANULOCYTES # BLD AUTO: 0 K/UL (ref 0–0.5)
IMM GRANULOCYTES NFR BLD AUTO: 0 % (ref 0–5)
LYMPHOCYTES # BLD: 1.6 K/UL (ref 0.5–4.6)
LYMPHOCYTES NFR BLD: 36 % (ref 13–44)
MCH RBC QN AUTO: 29.9 PG (ref 26.1–32.9)
MCHC RBC AUTO-ENTMCNC: 33.2 G/DL (ref 31.4–35)
MCV RBC AUTO: 90.1 FL (ref 82–102)
MONOCYTES # BLD: 0.3 K/UL (ref 0.1–1.3)
MONOCYTES NFR BLD: 6 % (ref 4–12)
NEUTS SEG # BLD: 2.5 K/UL (ref 1.7–8.2)
NEUTS SEG NFR BLD: 53 % (ref 43–78)
NRBC # BLD: 0 K/UL (ref 0–0.2)
PLATELET # BLD AUTO: 320 K/UL (ref 150–450)
PMV BLD AUTO: 9.8 FL (ref 9.4–12.3)
RBC # BLD AUTO: 4.35 M/UL (ref 4.05–5.2)
WBC # BLD AUTO: 4.6 K/UL (ref 4.3–11.1)

## 2024-12-09 PROCEDURE — 99214 OFFICE O/P EST MOD 30 MIN: CPT | Performed by: INTERNAL MEDICINE

## 2024-12-09 PROCEDURE — 93000 ELECTROCARDIOGRAM COMPLETE: CPT | Performed by: INTERNAL MEDICINE

## 2024-12-09 NOTE — PROGRESS NOTES
ears normal. TM normal. No discharge from ears.   Nose: Nose normal. Nares patent.   Throat: Clear. No exudates. No erythema.   Cardiac: Heart with normal rate/rhythm. No murmurs. No gallops. Pulses normal.   Pulmonary: Lungs clear to auscultation bilaterally. In no respiratory distress. No wheezing. No rales. No rhonci.   Gastrointestinal: Bowel sounds present. Abdomen soft and nondistended.   Musculoskeletal: Moves all extremities with good ROM. Non-tender. No swelling. No edema.   Skin: No rashes or abnormal moles. No lesions.   Neurological: No numbness. No tingling. Alert and oriented. At baseline. No confusion.   Psychiatric: Normal thought content. Normal behavior. Normal judgment.     Recommendations     Assessment:  Patient Active Problem List   Diagnosis    Psoriasis    Sleep disturbance    Anxiety    Strabismus    Stress    Herpes simplex type 1 infection      SLEEP APNEA: NONE    RCRI score: 0 points; 3.9 % Risk of major cardiac event    METS: 10-11 based on activity level    Plan:  -Check CBC, BMP, albumin, EKG.  -EKG normal  -Patient is cleared for surgery pending laboratory results.  -All the patient's chronic illnesses and medications were reviewed in the context of surgery risk.  Appropriate labs and testing were ordered based on these chronic illnesses and medications to determine stability and further risk stratify for surgery.  Labs requested by performing surgeon ordered.  -The patient expresses understanding of the plan as I've explained it to her and is in agreement with the current plan.  -Follow up with PCP post operatively    -Previous medical records and/or labs/tests available to me reviewed, any records outstanding not available requested  -The risks, benefits, and medical necessity of all medications, tests labs, and any other orders that were ordered at today's visit were discussed with the patient including all elective or patient requested orders.  Decision to order or not order

## 2024-12-10 LAB
ALBUMIN SERPL-MCNC: 3.8 G/DL (ref 3.5–5)
ALBUMIN SERPL-MCNC: 3.8 G/DL (ref 3.5–5)
ALBUMIN/GLOB SERPL: 1.3 (ref 1–1.9)
ALP SERPL-CCNC: 44 U/L (ref 35–104)
ALT SERPL-CCNC: 36 U/L (ref 8–45)
ANION GAP SERPL CALC-SCNC: 11 MMOL/L (ref 7–16)
AST SERPL-CCNC: 31 U/L (ref 15–37)
BILIRUB SERPL-MCNC: 0.4 MG/DL (ref 0–1.2)
BUN SERPL-MCNC: 11 MG/DL (ref 6–23)
CALCIUM SERPL-MCNC: 8.8 MG/DL (ref 8.8–10.2)
CHLORIDE SERPL-SCNC: 106 MMOL/L (ref 98–107)
CHOLEST SERPL-MCNC: 205 MG/DL (ref 0–200)
CO2 SERPL-SCNC: 25 MMOL/L (ref 20–29)
CREAT SERPL-MCNC: 0.73 MG/DL (ref 0.6–1.1)
GLOBULIN SER CALC-MCNC: 2.9 G/DL (ref 2.3–3.5)
GLUCOSE SERPL-MCNC: 92 MG/DL (ref 70–99)
HDLC SERPL-MCNC: 74 MG/DL (ref 40–60)
HDLC SERPL: 2.8 (ref 0–5)
LDLC SERPL CALC-MCNC: 118 MG/DL (ref 0–100)
POTASSIUM SERPL-SCNC: 4.5 MMOL/L (ref 3.5–5.1)
PROT SERPL-MCNC: 6.7 G/DL (ref 6.3–8.2)
SODIUM SERPL-SCNC: 142 MMOL/L (ref 136–145)
TRIGL SERPL-MCNC: 64 MG/DL (ref 0–150)
VLDLC SERPL CALC-MCNC: 13 MG/DL (ref 6–23)

## 2024-12-10 NOTE — RESULT ENCOUNTER NOTE
Patient's labs were normal.  Her cholesterol was very mildly elevated but her HDL which is her good cholesterol is also high which is very good.  She should continue focusing on eating healthy and exercising regularly and keeping these numbers stable or bring them down closer to the normal range.

## 2024-12-13 ENCOUNTER — TELEPHONE (OUTPATIENT)
Dept: INTERNAL MEDICINE CLINIC | Facility: CLINIC | Age: 49
End: 2024-12-13

## 2024-12-13 DIAGNOSIS — L40.9 PSORIASIS: Primary | ICD-10-CM

## 2024-12-13 NOTE — TELEPHONE ENCOUNTER
Patient called and states that she discussed her possible need for prednisone prior to her procedure. She states she does need this prescription sent to [facility] on file.

## 2024-12-16 RX ORDER — METHYLPREDNISOLONE 4 MG/1
TABLET ORAL
Qty: 1 KIT | Refills: 0 | Status: SHIPPED | OUTPATIENT
Start: 2024-12-16 | End: 2024-12-22

## 2024-12-16 NOTE — TELEPHONE ENCOUNTER
Patient called, upset that she has not had a response from Dr Bolden yet. Michelle is asking for a prescription of prednisone to help reduce the eye swelling that she will be having from eye surgery related to strabismus ( stated Dr Bolden knows about it)  Patient used the [facility] pharmacy on [address].   Michelle can be reached at [phone]

## 2025-06-10 ENCOUNTER — OFFICE VISIT (OUTPATIENT)
Dept: INTERNAL MEDICINE CLINIC | Facility: CLINIC | Age: 50
End: 2025-06-10
Payer: COMMERCIAL

## 2025-06-10 VITALS
BODY MASS INDEX: 18.24 KG/M2 | SYSTOLIC BLOOD PRESSURE: 86 MMHG | OXYGEN SATURATION: 100 % | HEART RATE: 79 BPM | DIASTOLIC BLOOD PRESSURE: 47 MMHG | WEIGHT: 113 LBS

## 2025-06-10 DIAGNOSIS — R07.9 LEFT-SIDED CHEST PAIN: ICD-10-CM

## 2025-06-10 DIAGNOSIS — R07.2 PRECORDIAL PAIN: Primary | ICD-10-CM

## 2025-06-10 LAB
ALBUMIN SERPL-MCNC: 3.7 G/DL (ref 3.5–5)
ALBUMIN/GLOB SERPL: 1.1 (ref 1–1.9)
ALP SERPL-CCNC: 45 U/L (ref 35–104)
ALT SERPL-CCNC: 28 U/L (ref 8–45)
ANION GAP SERPL CALC-SCNC: 12 MMOL/L (ref 7–16)
AST SERPL-CCNC: 25 U/L (ref 15–37)
BASOPHILS # BLD: 0.06 K/UL (ref 0–0.2)
BASOPHILS NFR BLD: 1.4 % (ref 0–2)
BILIRUB SERPL-MCNC: 0.6 MG/DL (ref 0–1.2)
BUN SERPL-MCNC: 18 MG/DL (ref 6–23)
CALCIUM SERPL-MCNC: 9.5 MG/DL (ref 8.8–10.2)
CHLORIDE SERPL-SCNC: 106 MMOL/L (ref 98–107)
CHOLEST SERPL-MCNC: 220 MG/DL (ref 0–200)
CO2 SERPL-SCNC: 23 MMOL/L (ref 20–29)
CREAT SERPL-MCNC: 0.74 MG/DL (ref 0.6–1.1)
DIFFERENTIAL METHOD BLD: NORMAL
EOSINOPHIL # BLD: 0.12 K/UL (ref 0–0.8)
EOSINOPHIL NFR BLD: 2.8 % (ref 0.5–7.8)
ERYTHROCYTE [DISTWIDTH] IN BLOOD BY AUTOMATED COUNT: 13.4 % (ref 11.9–14.6)
GLOBULIN SER CALC-MCNC: 3.4 G/DL (ref 2.3–3.5)
GLUCOSE SERPL-MCNC: 89 MG/DL (ref 70–99)
HCT VFR BLD AUTO: 40 % (ref 35.8–46.3)
HDLC SERPL-MCNC: 87 MG/DL (ref 40–60)
HDLC SERPL: 2.5 (ref 0–5)
HGB BLD-MCNC: 13.7 G/DL (ref 11.7–15.4)
IMM GRANULOCYTES # BLD AUTO: 0.03 K/UL (ref 0–0.5)
IMM GRANULOCYTES NFR BLD AUTO: 0.7 % (ref 0–5)
LDLC SERPL CALC-MCNC: 120 MG/DL (ref 0–100)
LYMPHOCYTES # BLD: 1.57 K/UL (ref 0.5–4.6)
LYMPHOCYTES NFR BLD: 36.3 % (ref 13–44)
MAGNESIUM SERPL-MCNC: 2.5 MG/DL (ref 1.8–2.4)
MCH RBC QN AUTO: 30 PG (ref 26.1–32.9)
MCHC RBC AUTO-ENTMCNC: 34.3 G/DL (ref 31.4–35)
MCV RBC AUTO: 87.7 FL (ref 82–102)
MONOCYTES # BLD: 0.27 K/UL (ref 0.1–1.3)
MONOCYTES NFR BLD: 6.3 % (ref 4–12)
NEUTS SEG # BLD: 2.27 K/UL (ref 1.7–8.2)
NEUTS SEG NFR BLD: 52.5 % (ref 43–78)
NRBC # BLD: 0 K/UL (ref 0–0.2)
PLATELET # BLD AUTO: 290 K/UL (ref 150–450)
PMV BLD AUTO: 10.1 FL (ref 9.4–12.3)
POTASSIUM SERPL-SCNC: 4.5 MMOL/L (ref 3.5–5.1)
PROT SERPL-MCNC: 7.1 G/DL (ref 6.3–8.2)
RBC # BLD AUTO: 4.56 M/UL (ref 4.05–5.2)
SODIUM SERPL-SCNC: 142 MMOL/L (ref 136–145)
TRIGL SERPL-MCNC: 67 MG/DL (ref 0–150)
TSH W FREE THYROID IF ABNORMAL: 1.56 UIU/ML (ref 0.27–4.2)
VLDLC SERPL CALC-MCNC: 13 MG/DL (ref 6–23)
WBC # BLD AUTO: 4.3 K/UL (ref 4.3–11.1)

## 2025-06-10 PROCEDURE — 99214 OFFICE O/P EST MOD 30 MIN: CPT | Performed by: INTERNAL MEDICINE

## 2025-06-10 PROCEDURE — 93000 ELECTROCARDIOGRAM COMPLETE: CPT | Performed by: INTERNAL MEDICINE

## 2025-06-10 SDOH — ECONOMIC STABILITY: FOOD INSECURITY: WITHIN THE PAST 12 MONTHS, YOU WORRIED THAT YOUR FOOD WOULD RUN OUT BEFORE YOU GOT MONEY TO BUY MORE.: NEVER TRUE

## 2025-06-10 SDOH — ECONOMIC STABILITY: FOOD INSECURITY: WITHIN THE PAST 12 MONTHS, THE FOOD YOU BOUGHT JUST DIDN'T LAST AND YOU DIDN'T HAVE MONEY TO GET MORE.: NEVER TRUE

## 2025-06-10 ASSESSMENT — ENCOUNTER SYMPTOMS
COUGH: 0
SHORTNESS OF BREATH: 0
NAUSEA: 0
ORTHOPNEA: 0
HEMOPTYSIS: 0
BACK PAIN: 0
SPUTUM PRODUCTION: 0
ABDOMINAL PAIN: 0
VOMITING: 0

## 2025-06-10 ASSESSMENT — PATIENT HEALTH QUESTIONNAIRE - PHQ9
1. LITTLE INTEREST OR PLEASURE IN DOING THINGS: NOT AT ALL
SUM OF ALL RESPONSES TO PHQ QUESTIONS 1-9: 0
2. FEELING DOWN, DEPRESSED OR HOPELESS: NOT AT ALL
SUM OF ALL RESPONSES TO PHQ QUESTIONS 1-9: 0

## 2025-06-10 NOTE — PROGRESS NOTES
Alon Bolden D.O.   [place] [address]  Tel: [phone]    Office Visit: Follow Up     Patient Name: Michelle Oswald   :  1975   MRN:   [id number]      Today's Date: 06/10/25 11:31 AM    Subjective     The patient is a 50 y.o.-year-old female who presents for follow-up.    Today:   She reports her older sister has history of chest pain and had some tests done and it was found that \"parts of her heart\" are swollen.     She describes intermittent chest pain, mostly in the mornings, with no pain this morning. Pain is a soreness under her left breath, worse with deep breaths. She reports it will subside within an hour. She can reproduce the pain throughout the day with rubbing the area. She denies any SOB. She denies any pain with exertion. She denies any pressure type feelings. She denies any radiation of the pain. She denies any recent sickness.      Chest Pain   This is a new problem. The current episode started more than 1 month ago. The onset quality is gradual. The problem occurs intermittently. The problem has been unchanged. The pain is present in the lateral region. The pain is at a severity of 3/10. The pain is mild. Quality: soreness. The pain does not radiate. Pertinent negatives include no abdominal pain, back pain, claudication, cough, diaphoresis, dizziness, exertional chest pressure, fever, headaches, hemoptysis, irregular heartbeat, leg pain, lower extremity edema, malaise/fatigue, nausea, near-syncope, numbness, orthopnea, palpitations, PND, shortness of breath, sputum production, syncope, vomiting or weakness. The pain is aggravated by deep breathing and emotional upset. She has tried nothing for the symptoms. Risk factors include stress.   Her past medical history is significant for hyperlipidemia.      Review of Systems   Constitutional:  Negative for diaphoresis, fever and malaise/fatigue.   Respiratory:  Negative for cough,

## 2025-06-18 ENCOUNTER — RESULTS FOLLOW-UP (OUTPATIENT)
Dept: INTERNAL MEDICINE CLINIC | Facility: CLINIC | Age: 50
End: 2025-06-18

## 2025-08-12 ENCOUNTER — OFFICE VISIT (OUTPATIENT)
Dept: INTERNAL MEDICINE CLINIC | Facility: CLINIC | Age: 50
End: 2025-08-12
Payer: COMMERCIAL

## 2025-08-12 VITALS
WEIGHT: 113.5 LBS | DIASTOLIC BLOOD PRESSURE: 49 MMHG | BODY MASS INDEX: 18.24 KG/M2 | HEART RATE: 81 BPM | OXYGEN SATURATION: 98 % | SYSTOLIC BLOOD PRESSURE: 86 MMHG | HEIGHT: 66 IN

## 2025-08-12 DIAGNOSIS — G47.9 SLEEP DISTURBANCE: ICD-10-CM

## 2025-08-12 DIAGNOSIS — Z01.818 PREOPERATIVE CLEARANCE: Primary | ICD-10-CM

## 2025-08-12 DIAGNOSIS — F90.0 ATTENTION DEFICIT HYPERACTIVITY DISORDER (ADHD), PREDOMINANTLY INATTENTIVE TYPE: ICD-10-CM

## 2025-08-12 PROCEDURE — 99214 OFFICE O/P EST MOD 30 MIN: CPT | Performed by: INTERNAL MEDICINE

## 2025-08-12 RX ORDER — DEXTROAMPHETAMINE SACCHARATE, AMPHETAMINE ASPARTATE, DEXTROAMPHETAMINE SULFATE AND AMPHETAMINE SULFATE 1.25; 1.25; 1.25; 1.25 MG/1; MG/1; MG/1; MG/1
5 TABLET ORAL DAILY
Qty: 30 TABLET | Refills: 0 | Status: SHIPPED | OUTPATIENT
Start: 2025-08-12 | End: 2025-09-11

## 2025-08-12 RX ORDER — ESZOPICLONE 2 MG/1
2 TABLET, FILM COATED ORAL NIGHTLY
Qty: 30 TABLET | Refills: 2 | Status: SHIPPED | OUTPATIENT
Start: 2025-08-12 | End: 2025-11-10

## 2025-08-12 RX ORDER — DEXTROAMPHETAMINE SACCHARATE, AMPHETAMINE ASPARTATE, DEXTROAMPHETAMINE SULFATE AND AMPHETAMINE SULFATE 1.25; 1.25; 1.25; 1.25 MG/1; MG/1; MG/1; MG/1
5 TABLET ORAL DAILY
Qty: 30 TABLET | Refills: 0 | Status: SHIPPED | OUTPATIENT
Start: 2025-10-11 | End: 2025-11-10

## 2025-08-12 RX ORDER — DEXTROAMPHETAMINE SACCHARATE, AMPHETAMINE ASPARTATE, DEXTROAMPHETAMINE SULFATE AND AMPHETAMINE SULFATE 1.25; 1.25; 1.25; 1.25 MG/1; MG/1; MG/1; MG/1
5 TABLET ORAL DAILY
Qty: 30 TABLET | Refills: 0 | Status: SHIPPED | OUTPATIENT
Start: 2025-09-11 | End: 2025-10-11

## (undated) DEVICE — SNARE POLYP SM W13MMXL240CM SHTH DIA2.4MM OVL FLX DISP

## (undated) DEVICE — YANKAUER,BULB TIP,W/O VENT,RIGID,STERILE: Brand: MEDLINE

## (undated) DEVICE — AIRLIFE™ OXYGEN TUBING 7 FEET (2.1 M) CRUSH RESISTANT OXYGEN TUBING, VINYL TIPPED: Brand: AIRLIFE™

## (undated) DEVICE — SYRINGE, LUER SLIP, STERILE, 60ML: Brand: MEDLINE

## (undated) DEVICE — LUBE JELLY FOIL PACK 1.4 OZ: Brand: MEDLINE INDUSTRIES, INC.

## (undated) DEVICE — CONNECTOR TBNG OD5-7MM O2 END DISP

## (undated) DEVICE — CONTAINER FORMALIN PREFILLED 10% NBF 60ML

## (undated) DEVICE — SYRINGE MED 10ML LUERLOCK TIP W/O SFTY DISP

## (undated) DEVICE — NEEDLE SYR 18GA L1.5IN RED PLAS HUB S STL BLNT FILL W/O

## (undated) DEVICE — SYRINGE MED 3ML CLR PLAS STD N CTRL LUERLOCK TIP DISP

## (undated) DEVICE — SINGLE PORT MANIFOLD: Brand: NEPTUNE 2

## (undated) DEVICE — CANNULA NSL ORAL AD FOR CAPNOFLEX CO2 O2 AIRLFE

## (undated) DEVICE — KENDALL RADIOLUCENT FOAM MONITORING ELECTRODE RECTANGULAR SHAPE: Brand: KENDALL

## (undated) DEVICE — GAUZE,SPONGE,4"X4",12PLY,WOVEN,NS,LF: Brand: MEDLINE